# Patient Record
Sex: FEMALE | Race: WHITE | Employment: FULL TIME | URBAN - METROPOLITAN AREA
[De-identification: names, ages, dates, MRNs, and addresses within clinical notes are randomized per-mention and may not be internally consistent; named-entity substitution may affect disease eponyms.]

---

## 2018-05-05 ENCOUNTER — OFFICE VISIT (OUTPATIENT)
Dept: URGENT CARE | Facility: URGENT CARE | Age: 53
End: 2018-05-05
Payer: COMMERCIAL

## 2018-05-05 ENCOUNTER — RADIANT APPOINTMENT (OUTPATIENT)
Dept: GENERAL RADIOLOGY | Facility: CLINIC | Age: 53
End: 2018-05-05
Attending: FAMILY MEDICINE
Payer: COMMERCIAL

## 2018-05-05 VITALS
HEART RATE: 117 BPM | TEMPERATURE: 103.2 F | HEIGHT: 64 IN | SYSTOLIC BLOOD PRESSURE: 162 MMHG | WEIGHT: 194 LBS | BODY MASS INDEX: 33.12 KG/M2 | OXYGEN SATURATION: 98 % | DIASTOLIC BLOOD PRESSURE: 96 MMHG

## 2018-05-05 DIAGNOSIS — J98.8 RESPIRATORY INFECTION: ICD-10-CM

## 2018-05-05 DIAGNOSIS — J98.8 RESPIRATORY INFECTION: Primary | ICD-10-CM

## 2018-05-05 DIAGNOSIS — R32 URINARY INCONTINENCE, UNSPECIFIED TYPE: ICD-10-CM

## 2018-05-05 DIAGNOSIS — Z20.818 STREP THROAT EXPOSURE: ICD-10-CM

## 2018-05-05 DIAGNOSIS — R07.0 THROAT PAIN: ICD-10-CM

## 2018-05-05 LAB
ALBUMIN UR-MCNC: ABNORMAL MG/DL
APPEARANCE UR: CLEAR
BASOPHILS # BLD AUTO: 0 10E9/L (ref 0–0.2)
BASOPHILS NFR BLD AUTO: 0.2 %
BILIRUB UR QL STRIP: NEGATIVE
COLOR UR AUTO: YELLOW
DEPRECATED S PYO AG THROAT QL EIA: NORMAL
DIFFERENTIAL METHOD BLD: ABNORMAL
EOSINOPHIL # BLD AUTO: 0.2 10E9/L (ref 0–0.7)
EOSINOPHIL NFR BLD AUTO: 2.6 %
ERYTHROCYTE [DISTWIDTH] IN BLOOD BY AUTOMATED COUNT: 15.5 % (ref 10–15)
FLUAV+FLUBV AG SPEC QL: NEGATIVE
FLUAV+FLUBV AG SPEC QL: NEGATIVE
GLUCOSE UR STRIP-MCNC: NEGATIVE MG/DL
HCT VFR BLD AUTO: 37.3 % (ref 35–47)
HGB BLD-MCNC: 12.1 G/DL (ref 11.7–15.7)
HGB UR QL STRIP: NEGATIVE
KETONES UR STRIP-MCNC: NEGATIVE MG/DL
LEUKOCYTE ESTERASE UR QL STRIP: NEGATIVE
LYMPHOCYTES # BLD AUTO: 1 10E9/L (ref 0.8–5.3)
LYMPHOCYTES NFR BLD AUTO: 10.5 %
MCH RBC QN AUTO: 26.7 PG (ref 26.5–33)
MCHC RBC AUTO-ENTMCNC: 32.4 G/DL (ref 31.5–36.5)
MCV RBC AUTO: 82 FL (ref 78–100)
MONOCYTES # BLD AUTO: 0.7 10E9/L (ref 0–1.3)
MONOCYTES NFR BLD AUTO: 8.1 %
NEUTROPHILS # BLD AUTO: 7.1 10E9/L (ref 1.6–8.3)
NEUTROPHILS NFR BLD AUTO: 78.6 %
NITRATE UR QL: NEGATIVE
PH UR STRIP: 7.5 PH (ref 5–7)
PLATELET # BLD AUTO: 302 10E9/L (ref 150–450)
RBC # BLD AUTO: 4.54 10E12/L (ref 3.8–5.2)
RBC #/AREA URNS AUTO: NORMAL /HPF
SOURCE: ABNORMAL
SP GR UR STRIP: 1.02 (ref 1–1.03)
SPECIMEN SOURCE: NORMAL
SPECIMEN SOURCE: NORMAL
UROBILINOGEN UR STRIP-ACNC: 0.2 EU/DL (ref 0.2–1)
WBC # BLD AUTO: 9.1 10E9/L (ref 4–11)
WBC #/AREA URNS AUTO: NORMAL /HPF

## 2018-05-05 PROCEDURE — 87804 INFLUENZA ASSAY W/OPTIC: CPT | Performed by: FAMILY MEDICINE

## 2018-05-05 PROCEDURE — 81001 URINALYSIS AUTO W/SCOPE: CPT | Performed by: FAMILY MEDICINE

## 2018-05-05 PROCEDURE — 85025 COMPLETE CBC W/AUTO DIFF WBC: CPT | Performed by: FAMILY MEDICINE

## 2018-05-05 PROCEDURE — 71046 X-RAY EXAM CHEST 2 VIEWS: CPT

## 2018-05-05 PROCEDURE — 87081 CULTURE SCREEN ONLY: CPT | Performed by: FAMILY MEDICINE

## 2018-05-05 PROCEDURE — 36415 COLL VENOUS BLD VENIPUNCTURE: CPT | Performed by: FAMILY MEDICINE

## 2018-05-05 PROCEDURE — 99203 OFFICE O/P NEW LOW 30 MIN: CPT | Performed by: FAMILY MEDICINE

## 2018-05-05 PROCEDURE — 87880 STREP A ASSAY W/OPTIC: CPT | Performed by: FAMILY MEDICINE

## 2018-05-05 RX ORDER — OMEGA-3-ACID ETHYL ESTERS 1 G/1
2 CAPSULE, LIQUID FILLED ORAL 2 TIMES DAILY
COMMUNITY

## 2018-05-05 RX ORDER — AZITHROMYCIN 250 MG/1
TABLET, FILM COATED ORAL
Qty: 6 TABLET | Refills: 0 | Status: SHIPPED | OUTPATIENT
Start: 2018-05-05 | End: 2022-01-04

## 2018-05-05 RX ORDER — MULTIPLE VITAMINS W/ MINERALS TAB 9MG-400MCG
1 TAB ORAL DAILY
COMMUNITY

## 2018-05-06 LAB
BACTERIA SPEC CULT: NORMAL
SPECIMEN SOURCE: NORMAL

## 2018-05-06 NOTE — PATIENT INSTRUCTIONS
If any fevers (a temperature of 100.5 or above) persists after 48 hours on the antibiotic, see your primary provider.

## 2018-05-06 NOTE — PROGRESS NOTES
"SUBJECTIVE:   Darlin Quiroz is a 53 year old female presenting with a chief complaint of Respiratory/ENT symptoms:  Symptoms started yesterday and  include: fevers to 103+, chills, cough - productive since this morning.  Few small specks of blood in phlegm x 1 episode this morning, none since.  Pressure in both ears, feels like neck glands are swollen and sore throat.  Throat and upper chest feel like burning with coughing.  Exposed to strep at work.  No chest pain other than burning with cough, not short of breath.  No nasal symptoms.  No abdominal pain, no nausea, vomiting or diarrhea.  Also c/o incontinence of urine -- dribbling with or without coughing.  Has had this in the past.  Minor discomfort with voiding as well.  No frequency or urgency or hematuria.  Predisposing factors include:  Non smoker, no h/o asthma.      ROS:  5-Point Review of Systems Negative-- Except as stated above.    OBJECTIVE  BP (!) 162/96  Pulse 117  Temp 103.2  F (39.6  C) (Oral)  Ht 5' 3.5\" (1.613 m)  Wt 194 lb (88 kg)  LMP 04/10/2017 (Exact Date)  SpO2 98%  BMI 33.83 kg/m2  GENERAL:  Awake, alert and interactive. No acute distress.  HEENT:   NC/AT, EOMI, clear conjunctiva.  Nose clear.  Oropharynx moist and clear.  TM's and EAC's benign.  NECK: supple, soft and free of adenopathy  CHEST:  Lungs are clear, no rhonchi, wheezing or rales. Normal symmetric air entry throughout both lung fields.   HEART:  S1 and S2 normal, no murmurs, clicks, gallops or rubs. Regular rate and rhythm.    CXR - no infiltrates or effusions noted.  Final read came back while patient still in clinic and reviewed with her as well.    Results for orders placed or performed in visit on 05/05/18   CBC with platelets and differential   Result Value Ref Range    WBC 9.1 4.0 - 11.0 10e9/L    RBC Count 4.54 3.8 - 5.2 10e12/L    Hemoglobin 12.1 11.7 - 15.7 g/dL    Hematocrit 37.3 35.0 - 47.0 %    MCV 82 78 - 100 fl    MCH 26.7 26.5 - 33.0 pg    MCHC 32.4 31.5 - " 36.5 g/dL    RDW 15.5 (H) 10.0 - 15.0 %    Platelet Count 302 150 - 450 10e9/L    Diff Method Automated Method     % Neutrophils 78.6 %    % Lymphocytes 10.5 %    % Monocytes 8.1 %    % Eosinophils 2.6 %    % Basophils 0.2 %    Absolute Neutrophil 7.1 1.6 - 8.3 10e9/L    Absolute Lymphocytes 1.0 0.8 - 5.3 10e9/L    Absolute Monocytes 0.7 0.0 - 1.3 10e9/L    Absolute Eosinophils 0.2 0.0 - 0.7 10e9/L    Absolute Basophils 0.0 0.0 - 0.2 10e9/L   *UA reflex to Microscopic and Culture ([facility] (except [place] and [place])   Result Value Ref Range    Color Urine Yellow     Appearance Urine Clear     Glucose Urine Negative NEG^Negative mg/dL    Bilirubin Urine Negative NEG^Negative    Ketones Urine Negative NEG^Negative mg/dL    Specific Gravity Urine 1.020 1.003 - 1.035    Blood Urine Negative NEG^Negative    pH Urine 7.5 (H) 5.0 - 7.0 pH    Protein Albumin Urine Trace (A) NEG^Negative mg/dL    Urobilinogen Urine 0.2 0.2 - 1.0 EU/dL    Nitrite Urine Negative NEG^Negative    Leukocyte Esterase Urine Negative NEG^Negative    Source Midstream Urine    Urine Microscopic   Result Value Ref Range    WBC Urine 0 - 5 OTO5^0 - 5 /HPF    RBC Urine O - 2 OTO2^O - 2 /HPF   Strep, Rapid Screen   Result Value Ref Range    Specimen Description Throat     Rapid Strep A Screen       NEGATIVE: No Group A streptococcal antigen detected by immunoassay, await culture report.   Influenza A/B antigen   Result Value Ref Range    Influenza A/B Agn Specimen Nasopharyngeal     Influenza A Negative NEG^Negative    Influenza B Negative NEG^Negative       ASSESSMENT/PLAN    ICD-10-CM    1. Respiratory infection J98.8 XR Chest 2 Views     CBC with platelets and differential   2. Throat pain R07.0 Strep, Rapid Screen     Influenza A/B antigen     Beta strep group A culture   3. Strep throat exposure Z20.818    4. Urinary incontinence, unspecified type R32 *UA reflex to Microscopic and Culture ([facility] (except  [place] and [place])     CANCELED: UA reflex to Microscopic (Natalya Lang; [facility])       She took 600 mg of her own ibuprofen when she arrived at the office this evening.   CXR negative, WBC wnl, influenza negative.  Concern for possible pneumonia with symptoms today and will cover for this.  Discussed influenza remains in our differential despite negative swab.  Recheck with PCP in 10-14 days.  Sooner if any worsening symptoms develop.  Home until fever free for 24 hours.     Patient Instructions   If any fevers (a temperature of 100.5 or above) persists after 48 hours on the antibiotic, see your primary provider.

## 2020-11-22 ENCOUNTER — HEALTH MAINTENANCE LETTER (OUTPATIENT)
Age: 55
End: 2020-11-22

## 2021-05-28 ENCOUNTER — RECORDS - HEALTHEAST (OUTPATIENT)
Dept: ADMINISTRATIVE | Facility: CLINIC | Age: 56
End: 2021-05-28

## 2021-05-29 ENCOUNTER — RECORDS - HEALTHEAST (OUTPATIENT)
Dept: ADMINISTRATIVE | Facility: CLINIC | Age: 56
End: 2021-05-29

## 2021-05-31 ENCOUNTER — RECORDS - HEALTHEAST (OUTPATIENT)
Dept: ADMINISTRATIVE | Facility: CLINIC | Age: 56
End: 2021-05-31

## 2021-09-19 ENCOUNTER — HEALTH MAINTENANCE LETTER (OUTPATIENT)
Age: 56
End: 2021-09-19

## 2022-01-04 ENCOUNTER — VIRTUAL VISIT (OUTPATIENT)
Dept: FAMILY MEDICINE | Facility: CLINIC | Age: 57
End: 2022-01-04
Payer: COMMERCIAL

## 2022-01-04 DIAGNOSIS — J06.9 VIRAL UPPER RESPIRATORY ILLNESS: Primary | ICD-10-CM

## 2022-01-04 PROCEDURE — 99203 OFFICE O/P NEW LOW 30 MIN: CPT | Mod: GT | Performed by: PHYSICIAN ASSISTANT

## 2022-01-04 NOTE — PROGRESS NOTES
Darlin is a 56 year old who is being evaluated via a billable telephone visit.      What phone number would you like to be contacted at? [phone]  How would you like to obtain your AVS?     Assessment & Plan     Viral upper respiratory illness  COVID test ordered and she will be contacted for collection.   Watch MyChart for results   Isolate and take precaution. Continue with symptomatic treatments with OTC medications also, rest and fluids.   - Symptomatic; Yes; 1/2/2022 COVID-19 Virus (Coronavirus) by PCR; Future                 Return today (on 1/4/2022) for follow instructions given.    Kristen M. Kehr, PA-C [facility]   Darlin is a 56 year old who presents for the following health issues     HPI     Acute Illness  Acute illness concerns: URI  Onset/Duration: 2 days  Symptoms:  Fever: no  Chills/Sweats: YES- little chill last night  Headache (location?): YES  Sinus Pressure: YES  Conjunctivitis:  no  Ear Pain: YES- plugged  Rhinorrhea: YES  Congestion: YES  Sore Throat: YES  Cough: YES-productive of yellow sputum  Wheeze: no  Decreased Appetite: YES  Nausea: no  Vomiting: no  Diarrhea: no  Dysuria/Freq.: no  Dysuria or Hematuria: no  Fatigue/Achiness: YES- last night, also knees and breasts are sore per patient.  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      Objective           Vitals:  No vitals were obtained today due to virtual visit.    Physical Exam   healthy, alert and no distress  PSYCH: Alert and oriented times 3; coherent speech, normal   rate and volume, able to articulate logical thoughts, able   to abstract reason, no tangential thoughts, no hallucinations   or delusions  Her affect is normal  RESP: No cough, no audible wheezing, able to  talk in full sentences  Remainder of exam unable to be completed due to telephone visits                Phone call duration: 15 minutes

## 2022-01-04 NOTE — PATIENT INSTRUCTIONS
COVID-19 testing at [facility] is by appointment only. You'll need to schedule a time to get tested. The test is ordered, you will be contacted to schedule. Please be aware that this call will come from a BLOCKED number, so please answer to schedule.     [employer] is also the fastest way to get your test results. You'll get your results in [employer] within 3 days. If you don't use [employer], you'll get your results in the mail in 7 to 10 days. If your test is positive and you don't view your result in [employer] within 1 business day, you'll get a phone call with your result. A positive result means that you have COVID-19.    If you have an upcoming procedure at [facility], you'll need to be tested for COVID. The test needs to happen 2 to 4 days before your procedure. If you have an upcoming procedure, we will contact you to schedule a COVID test.    If you don't have a [employer] account, please call [phone] to set up a virtual visit. You can also find community testing sites in [place] at [email or web address]. If you live in [place], please visit [email or web address].

## 2022-01-06 ENCOUNTER — LAB (OUTPATIENT)
Dept: URGENT CARE | Facility: URGENT CARE | Age: 57
End: 2022-01-06
Attending: PHYSICIAN ASSISTANT
Payer: COMMERCIAL

## 2022-01-06 DIAGNOSIS — J06.9 VIRAL UPPER RESPIRATORY ILLNESS: ICD-10-CM

## 2022-01-06 PROCEDURE — U0003 INFECTIOUS AGENT DETECTION BY NUCLEIC ACID (DNA OR RNA); SEVERE ACUTE RESPIRATORY SYNDROME CORONAVIRUS 2 (SARS-COV-2) (CORONAVIRUS DISEASE [COVID-19]), AMPLIFIED PROBE TECHNIQUE, MAKING USE OF HIGH THROUGHPUT TECHNOLOGIES AS DESCRIBED BY CMS-2020-01-R: HCPCS

## 2022-01-06 PROCEDURE — U0005 INFEC AGEN DETEC AMPLI PROBE: HCPCS

## 2022-01-07 LAB — SARS-COV-2 RNA RESP QL NAA+PROBE: POSITIVE

## 2022-01-09 ENCOUNTER — HEALTH MAINTENANCE LETTER (OUTPATIENT)
Age: 57
End: 2022-01-09

## 2022-11-20 ENCOUNTER — HEALTH MAINTENANCE LETTER (OUTPATIENT)
Age: 57
End: 2022-11-20

## 2023-04-15 ENCOUNTER — HEALTH MAINTENANCE LETTER (OUTPATIENT)
Age: 58
End: 2023-04-15

## 2024-02-03 ENCOUNTER — HEALTH MAINTENANCE LETTER (OUTPATIENT)
Age: 59
End: 2024-02-03

## 2024-06-22 ENCOUNTER — HEALTH MAINTENANCE LETTER (OUTPATIENT)
Age: 59
End: 2024-06-22